# Patient Record
Sex: FEMALE | Race: BLACK OR AFRICAN AMERICAN | NOT HISPANIC OR LATINO | URBAN - METROPOLITAN AREA
[De-identification: names, ages, dates, MRNs, and addresses within clinical notes are randomized per-mention and may not be internally consistent; named-entity substitution may affect disease eponyms.]

---

## 2023-08-02 ENCOUNTER — OFFICE (OUTPATIENT)
Dept: URBAN - METROPOLITAN AREA CLINIC 105 | Facility: CLINIC | Age: 43
End: 2023-08-02

## 2023-08-02 VITALS
HEART RATE: 89 BPM | SYSTOLIC BLOOD PRESSURE: 115 MMHG | DIASTOLIC BLOOD PRESSURE: 80 MMHG | OXYGEN SATURATION: 99 % | WEIGHT: 195 LBS | HEIGHT: 63 IN

## 2023-08-02 DIAGNOSIS — R10.13 EPIGASTRIC PAIN: ICD-10-CM

## 2023-08-02 DIAGNOSIS — K62.5 HEMORRHAGE OF ANUS AND RECTUM: ICD-10-CM

## 2023-08-02 DIAGNOSIS — R05.9 COUGH, UNSPECIFIED: ICD-10-CM

## 2023-08-02 DIAGNOSIS — K59.09 OTHER CONSTIPATION: ICD-10-CM

## 2023-08-02 DIAGNOSIS — Z80.0 FAMILY HISTORY OF MALIGNANT NEOPLASM OF DIGESTIVE ORGANS: ICD-10-CM

## 2023-08-02 DIAGNOSIS — K21.9 GASTRO-ESOPHAGEAL REFLUX DISEASE WITHOUT ESOPHAGITIS: ICD-10-CM

## 2023-08-02 PROCEDURE — 99204 OFFICE O/P NEW MOD 45 MIN: CPT

## 2023-08-02 RX ORDER — OMEPRAZOLE 20 MG/1
CAPSULE, DELAYED RELEASE ORAL
Qty: 90 | Refills: 3 | Status: ACTIVE
Start: 2023-08-02

## 2023-08-02 NOTE — SERVICENOTES
Start omeprazole 20 mg daily take 30 to 60 minutes before evening meal

Rectal cream can apply to affected area twice a day

For your constipation start MiraLAX 1 capful daily

2-day colonoscopy prep

Colonoscopy and EGD to be completed

Follow-up in clinic 2 weeks after scope

## 2023-08-02 NOTE — SERVICEHPINOTES
Yuliya Mcmahan   is seen for an initial visit today. Patient has a family history of colon cancer in father. Last colonoscopy was July 23, 2018 and it was normal. It was recommended to repeat in 5 years time. 
clemente cornejo constipation–patient with chronic constipation, in the past she took MiraLAX every day with resolution of symptoms but she stopped taking it as she felt well. She had a pretty rough weekend with her constipation and lower abdominal pain, improved after bowel movement this morning. Feels she now has a external hemorrhoid that is bleeding. It is not painful. 
clemente cornejo   Cough––patient reports chronic cough over the last 2 to 3 years, sometimes worse with laying down. She reports acid reflux, acid taste in mouth, heartburn, intermittent epigastric discomfort that waxes and wanes. On rare occasion reflux will wake her up in the middle of the night. She denies any dysphagia. Has not tried any medications.Mother with history of GERDShe has not had any abdominal surgeries. 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